# Patient Record
Sex: FEMALE | Race: WHITE | NOT HISPANIC OR LATINO | URBAN - METROPOLITAN AREA
[De-identification: names, ages, dates, MRNs, and addresses within clinical notes are randomized per-mention and may not be internally consistent; named-entity substitution may affect disease eponyms.]

---

## 2017-08-22 ENCOUNTER — EMERGENCY (EMERGENCY)
Facility: HOSPITAL | Age: 52
LOS: 0 days | Discharge: HOME | End: 2017-08-22
Attending: EMERGENCY MEDICINE | Admitting: EMERGENCY MEDICINE
Payer: MEDICAID

## 2017-08-22 VITALS
OXYGEN SATURATION: 99 % | RESPIRATION RATE: 17 BRPM | SYSTOLIC BLOOD PRESSURE: 108 MMHG | HEART RATE: 88 BPM | DIASTOLIC BLOOD PRESSURE: 58 MMHG

## 2017-08-22 VITALS
OXYGEN SATURATION: 96 % | HEART RATE: 108 BPM | TEMPERATURE: 99 F | DIASTOLIC BLOOD PRESSURE: 85 MMHG | HEIGHT: 64 IN | RESPIRATION RATE: 18 BRPM | WEIGHT: 154.98 LBS | SYSTOLIC BLOOD PRESSURE: 124 MMHG

## 2017-08-22 DIAGNOSIS — S82.62XA DISPLACED FRACTURE OF LATERAL MALLEOLUS OF LEFT FIBULA, INITIAL ENCOUNTER FOR CLOSED FRACTURE: ICD-10-CM

## 2017-08-22 DIAGNOSIS — S89.82XA OTHER SPECIFIED INJURIES OF LEFT LOWER LEG, INITIAL ENCOUNTER: ICD-10-CM
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
## 2017-08-22 DIAGNOSIS — F17.210 NICOTINE DEPENDENCE, CIGARETTES, UNCOMPLICATED: ICD-10-CM
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
## 2017-08-22 PROCEDURE — 73630 X-RAY EXAM OF FOOT: CPT | Mod: 26,LT

## 2017-08-22 PROCEDURE — 99285 EMERGENCY DEPT VISIT HI MDM: CPT

## 2017-08-22 PROCEDURE — 73600 X-RAY EXAM OF ANKLE: CPT | Mod: 26

## 2017-08-22 PROCEDURE — 73610 X-RAY EXAM OF ANKLE: CPT | Mod: 26,RT

## 2017-08-22 RX ORDER — OXYCODONE AND ACETAMINOPHEN 5; 325 MG/1; MG/1
1 TABLET ORAL ONCE
Qty: 0 | Refills: 0 | Status: DISCONTINUED | OUTPATIENT
Start: 2017-08-22 | End: 2017-08-22

## 2017-08-22 RX ORDER — ACETAMINOPHEN 500 MG
650 TABLET ORAL ONCE
Qty: 0 | Refills: 0 | Status: COMPLETED | OUTPATIENT
Start: 2017-08-22 | End: 2017-08-22

## 2017-08-22 RX ADMIN — Medication 650 MILLIGRAM(S): at 12:43

## 2017-08-22 RX ADMIN — OXYCODONE AND ACETAMINOPHEN 1 TABLET(S): 5; 325 TABLET ORAL at 14:58

## 2017-08-22 NOTE — ED PROVIDER NOTE - OBJECTIVE STATEMENT
52 year old female with no significant past medical history, in with fall today, states was stumbling down about 12 stairs and twisted her ankle and felt a pop. Now with left ankle pain.  Denies hitting her head, denies LOC.  Nothing else hurting.  No prior injury to that ankle.  Tried to stand, but was unable to bear weight.  Does endorse drinking 3 "tall boys" today.

## 2017-08-22 NOTE — ED PROVIDER NOTE - MEDICAL DECISION MAKING DETAILS
52F in with ankle pain s/p stumbling down stairs,  ankle xray, pain meds, likely splint with crutches.

## 2017-08-22 NOTE — CONSULT NOTE ADULT - SUBJECTIVE AND OBJECTIVE BOX
Orthopedics Clinic Note:    This is a 53y/o female who presents to the ED c/o left ankle pain, deformity and inability to bear weight s/p fall down several stairs with twisting ankle injury and audible 'pop.' Pt admits to drinking prior to fall. Pt denies any pain or injury anywhere else. Pt denies numbness, tingling and paresthesias.     PMHx:  Current everyday smoker    PSHx:  Denies    Allergies:  Doxycycline    Medications:  Denies    X-rays show a lateral mal ankle fracture with widened medial clear-space/mortise and possible small posterior mal fx; dionne/trimal? equivalent.    PE left ankle:  +deformity, +mild-moderate swelling, skin intact, +ecchymosis, no erythema, normothermic. +TTP. LROM 2' pain. Moving all toes, sensation intact. DP and PT pulses 2+.    Procedure:  Closed reduction of left ankle fracture and application of trilam splint performed at bedside in ED after hematoma block of mL 1% lidocaine administered by Resident. Pt tolerated procedure well with improved pain, sensation intact, moving all toes, cap refill <2secs post-procedure.    Assessment:   53y/o female with left ankle dionne/?trimal exquivalent fracture.    Plan:  NWB LLE in Trilam splint with crutches.  Pain control.  Ice application to left ankle.  LLE elevation.    Orthopedic Attending aware of consult; Awaiting definitive plan. Orthopedics Clinic Note:    This is a 51y/o female who presents to the ED c/o left ankle pain, deformity and inability to bear weight s/p fall down several stairs with twisting ankle injury and audible 'pop.' Pt admits to drinking prior to fall. Pt denies any pain or injury anywhere else. Pt denies numbness, tingling and paresthesias.     PMHx:  Current everyday smoker    PSHx:  Denies    Allergies:  Doxycycline    Medications:  Denies    X-rays show a lateral mal ankle fracture with widened medial clear-space/mortise and possible small posterior mal fx; dionne/trimal? equivalent.    PE left ankle:  +deformity, +mild-moderate swelling, skin intact, +ecchymosis, no erythema, normothermic. +TTP. LROM 2' pain. Moving all toes, sensation intact. DP and PT pulses 2+.    Procedure:  Closed reduction of left ankle fracture and application of trilam splint performed at bedside in ED after hematoma block of mL 1% lidocaine administered by Resident. Pt tolerated procedure well with improved pain, sensation intact, moving all toes, cap refill <2secs post-procedure.    Assessment:   51y/o female with left ankle dionne/?trimal exquivalent fracture.    Plan:  NWB LLE in Trilam splint with crutches.  Pain control.  Ice application to left ankle.  LLE elevation.    Orthopedic Attending aware of consult; Awaiting definitive plan.    Addendum 8/22/17 8820:    Case discussed with Dr. Lazo.  Plan as above.  Pt is orthopedically stable for discharge home.  Pt advised that she will need surgical fixation of ankle fracture when swelling subsides; to be scheduled as OP.   Recommend f/u with [facility].  Pt can f/u with Dr. Lazo as OP prn any issues. Orthopedics Clinic Note:    This is a 51y/o female who presents to the ED c/o left ankle pain, deformity and inability to bear weight s/p fall down several stairs with twisting ankle injury and audible 'pop.' Pt admits to drinking prior to fall. Pt denies any pain or injury anywhere else. Pt denies numbness, tingling and paresthesias.     PMHx:  Current everyday smoker    PSHx:  Denies    Allergies:  Doxycycline    Medications:  Denies    X-rays show a lateral mal ankle fracture with widened medial clear-space/mortise and possible small posterior mal fx; dionne/trimal? equivalent.    PE left ankle:  +deformity, +mild-moderate swelling, skin intact, +ecchymosis, no erythema, normothermic. +TTP. LROM 2' pain. Moving all toes, sensation intact. DP and PT pulses 2+.    Procedure:  Closed reduction of left ankle fracture and application of trilam splint performed at bedside in ED after hematoma block of mL 1% lidocaine administered by Resident. Pt tolerated procedure well with improved pain, sensation intact, moving all toes, cap refill <2secs post-procedure.    Assessment:   51y/o female with left ankle dionne/?trimal exquivalent fracture.    Plan:  NWB LLE in Trilam splint with crutches.  Pain control.  Ice application to left ankle.  LLE elevation.    Orthopedic Attending aware of consult; Awaiting definitive plan.    Addendum 8/22/17 6610:    Post-reduction X-rays of the left ankle shows improved fracture alignment.    Case discussed with Dr. Lazo.  Plan as above.  Pt is orthopedically stable for discharge home.  Pt advised that she will need surgical fixation of ankle fracture when swelling subsides; to be scheduled as OP.   Recommend f/u with [facility].  Pt can f/u with Dr. Lazo as OP prn any issues. Orthopedics Clinic Note:    This is a 51y/o female who presents to the ED c/o left ankle pain, deformity and inability to bear weight s/p fall down several stairs with twisting ankle injury and audible 'pop.' Pt admits to drinking prior to fall. Pt denies any pain or injury anywhere else. Pt denies numbness, tingling and paresthesias.     PMHx:  Current everyday smoker    PSHx:  Denies    Allergies:  Doxycycline    Medications:  Denies    X-rays show a lateral mal ankle fracture with widened medial clear-space/mortise and possible small posterior mal fx; dionne/trimal? equivalent.    PE left ankle:  +deformity, +mild-moderate swelling, skin intact, +ecchymosis, no erythema, normothermic. +TTP. LROM 2' pain. Moving all toes, sensation intact. DP and PT pulses 2+.    Procedure:  Closed reduction of left ankle fracture and application of trilam splint performed at bedside in ED after hematoma block of 8mL 1% lidocaine administered by Resident. Pt tolerated procedure well with improved pain, sensation intact, moving all toes, cap refill <2secs post-procedure.    Assessment:   51y/o female with left ankle dionne/?trimal exquivalent fracture.    Plan:  NWB LLE in Trilam splint with crutches.  Pain control.  Ice application to left ankle.  LLE elevation.    Orthopedic Attending aware of consult; Awaiting definitive plan.    Addendum 8/22/17 1900:    Post-reduction X-rays of the left ankle shows improved fracture alignment.    Case discussed with Dr. Lazo.  Plan as above.  Pt is orthopedically stable for discharge home.  Pt advised that she will need surgical fixation of ankle fracture when swelling subsides; to be scheduled as OP.   Recommend f/u with [facility].  Pt can f/u with Dr. Lazo as OP prn any issues.

## 2017-08-22 NOTE — ED PROVIDER NOTE - PHYSICAL EXAMINATION
cap refill < 2 seconds, pulses intact, swelling and pain to palpation over medial malleolus. ROM toes normal, sensation intact.  Limited ROM of ankle 2/2 pain.

## 2017-08-22 NOTE — ED PROVIDER NOTE - ATTENDING CONTRIBUTION TO CARE
AJM: Pt is a 52 year old female with history of active etoh abuse presents with left ankle injury. She notes she tripped on the stairs and injured her ankle. No hrad trauma/loc. No back, chest, abd pain. No numbness in left foot. On exam pt has swelling, ttp, and bruising ot medial mal in left ankle. likely fx. NV intact. xrays, pain meds

## 2017-08-22 NOTE — ED ADULT NURSE NOTE - OBJECTIVE STATEMENT
PT is AOB, belligerent and tearful. Pt states she was walking down stairs ( approx 12) " kicking boxes down the steps in anger and heard her ankle snap" pt denies LOC, states " I was drinking." pt consumed approx 3 " tallboys". Left ankle medial is bruised and swollen painful to palpation

## 2017-09-06 ENCOUNTER — APPOINTMENT (OUTPATIENT)
Dept: ORTHOPEDIC SURGERY | Facility: CLINIC | Age: 52
End: 2017-09-06
Payer: COMMERCIAL

## 2017-09-06 VITALS
BODY MASS INDEX: 26.58 KG/M2 | DIASTOLIC BLOOD PRESSURE: 81 MMHG | SYSTOLIC BLOOD PRESSURE: 118 MMHG | HEIGHT: 63 IN | TEMPERATURE: 98.2 F | WEIGHT: 150 LBS | HEART RATE: 80 BPM

## 2017-09-06 DIAGNOSIS — S82.852A DISPLACED TRIMALLEOLAR FRACTURE OF LEFT LOWER LEG, INITIAL ENCOUNTER FOR CLOSED FRACTURE: ICD-10-CM

## 2017-09-06 DIAGNOSIS — F17.200 NICOTINE DEPENDENCE, UNSPECIFIED, UNCOMPLICATED: ICD-10-CM

## 2017-09-06 DIAGNOSIS — Z87.09 PERSONAL HISTORY OF OTHER DISEASES OF THE RESPIRATORY SYSTEM: ICD-10-CM

## 2017-09-06 PROBLEM — Z00.00 ENCOUNTER FOR PREVENTIVE HEALTH EXAMINATION: Status: ACTIVE | Noted: 2017-09-06

## 2017-09-06 PROCEDURE — 99214 OFFICE O/P EST MOD 30 MIN: CPT

## 2017-09-07 ENCOUNTER — OUTPATIENT (OUTPATIENT)
Dept: OUTPATIENT SERVICES | Facility: HOSPITAL | Age: 52
LOS: 1 days | Discharge: HOME | End: 2017-09-07
Payer: MEDICAID

## 2017-09-07 DIAGNOSIS — Z98.890 OTHER SPECIFIED POSTPROCEDURAL STATES: Chronic | ICD-10-CM

## 2017-09-07 DIAGNOSIS — S82.852A DISPLACED TRIMALLEOLAR FRACTURE OF LEFT LOWER LEG, INITIAL ENCOUNTER FOR CLOSED FRACTURE: ICD-10-CM

## 2017-09-07 DIAGNOSIS — Z96.7 PRESENCE OF OTHER BONE AND TENDON IMPLANTS: Chronic | ICD-10-CM

## 2017-09-07 LAB
ANION GAP SERPL CALC-SCNC: 7 MMOL/L — SIGNIFICANT CHANGE UP (ref 5–17)
APPEARANCE UR: CLEAR — SIGNIFICANT CHANGE UP
APTT BLD: 34.7 SEC — SIGNIFICANT CHANGE UP (ref 27.5–37.4)
BASOPHILS # BLD AUTO: 0.1 K/UL — SIGNIFICANT CHANGE UP (ref 0–0.2)
BASOPHILS NFR BLD AUTO: 1.5 % — SIGNIFICANT CHANGE UP (ref 0–2)
BILIRUB UR-MCNC: NEGATIVE — SIGNIFICANT CHANGE UP
BUN SERPL-MCNC: 6 MG/DL — LOW (ref 7–23)
CALCIUM SERPL-MCNC: 9.7 MG/DL — SIGNIFICANT CHANGE UP (ref 8.5–10.1)
CHLORIDE SERPL-SCNC: 108 MMOL/L — SIGNIFICANT CHANGE UP (ref 96–108)
CO2 SERPL-SCNC: 28 MMOL/L — SIGNIFICANT CHANGE UP (ref 22–31)
COLOR SPEC: YELLOW — SIGNIFICANT CHANGE UP
CREAT SERPL-MCNC: 0.74 MG/DL — SIGNIFICANT CHANGE UP (ref 0.5–1.3)
DIFF PNL FLD: NEGATIVE — SIGNIFICANT CHANGE UP
EOSINOPHIL # BLD AUTO: 0.3 K/UL — SIGNIFICANT CHANGE UP (ref 0–0.5)
EOSINOPHIL NFR BLD AUTO: 3.9 % — SIGNIFICANT CHANGE UP (ref 0–6)
GLUCOSE SERPL-MCNC: 95 MG/DL — SIGNIFICANT CHANGE UP (ref 70–99)
GLUCOSE UR QL: NEGATIVE MG/DL — SIGNIFICANT CHANGE UP
HCT VFR BLD CALC: 40.3 % — SIGNIFICANT CHANGE UP (ref 34.5–45)
HGB BLD-MCNC: 13.8 G/DL — SIGNIFICANT CHANGE UP (ref 11.5–15.5)
INR BLD: 1.12 RATIO — SIGNIFICANT CHANGE UP (ref 0.88–1.16)
KETONES UR-MCNC: NEGATIVE — SIGNIFICANT CHANGE UP
LEUKOCYTE ESTERASE UR-ACNC: NEGATIVE — SIGNIFICANT CHANGE UP
LYMPHOCYTES # BLD AUTO: 2.3 K/UL — SIGNIFICANT CHANGE UP (ref 1–3.3)
LYMPHOCYTES # BLD AUTO: 26.3 % — SIGNIFICANT CHANGE UP (ref 13–44)
MCHC RBC-ENTMCNC: 30.7 PG — SIGNIFICANT CHANGE UP (ref 27–34)
MCHC RBC-ENTMCNC: 34.2 GM/DL — SIGNIFICANT CHANGE UP (ref 32–36)
MCV RBC AUTO: 89.6 FL — SIGNIFICANT CHANGE UP (ref 80–100)
MONOCYTES # BLD AUTO: 0.5 K/UL — SIGNIFICANT CHANGE UP (ref 0–0.9)
MONOCYTES NFR BLD AUTO: 5.4 % — SIGNIFICANT CHANGE UP (ref 2–14)
NEUTROPHILS # BLD AUTO: 5.5 K/UL — SIGNIFICANT CHANGE UP (ref 1.8–7.4)
NEUTROPHILS NFR BLD AUTO: 63 % — SIGNIFICANT CHANGE UP (ref 43–77)
NITRITE UR-MCNC: NEGATIVE — SIGNIFICANT CHANGE UP
PH UR: 7 — SIGNIFICANT CHANGE UP (ref 5–8)
PLATELET # BLD AUTO: 320 K/UL — SIGNIFICANT CHANGE UP (ref 150–400)
POTASSIUM SERPL-MCNC: 3.9 MMOL/L — SIGNIFICANT CHANGE UP (ref 3.5–5.3)
POTASSIUM SERPL-SCNC: 3.9 MMOL/L — SIGNIFICANT CHANGE UP (ref 3.5–5.3)
PROT UR-MCNC: NEGATIVE MG/DL — SIGNIFICANT CHANGE UP
PROTHROM AB SERPL-ACNC: 12.1 SEC — SIGNIFICANT CHANGE UP (ref 9.8–12.7)
RBC # BLD: 4.5 M/UL — SIGNIFICANT CHANGE UP (ref 3.8–5.2)
RBC # FLD: 11.9 % — SIGNIFICANT CHANGE UP (ref 10.3–14.5)
SODIUM SERPL-SCNC: 143 MMOL/L — SIGNIFICANT CHANGE UP (ref 135–145)
SP GR SPEC: 1 — LOW (ref 1.01–1.02)
TYPE + AB SCN PNL BLD: SIGNIFICANT CHANGE UP
UROBILINOGEN FLD QL: NEGATIVE MG/DL — SIGNIFICANT CHANGE UP
WBC # BLD: 8.8 K/UL — SIGNIFICANT CHANGE UP (ref 3.8–10.5)
WBC # FLD AUTO: 8.8 K/UL — SIGNIFICANT CHANGE UP (ref 3.8–10.5)

## 2017-09-07 PROCEDURE — 93010 ELECTROCARDIOGRAM REPORT: CPT

## 2017-09-07 PROCEDURE — 71020: CPT | Mod: 26

## 2017-09-07 RX ORDER — ASCORBIC ACID 60 MG
1 TABLET,CHEWABLE ORAL
Qty: 0 | Refills: 0 | COMMUNITY

## 2017-09-07 RX ORDER — MULTIVIT-MIN/FERROUS GLUCONATE 9 MG/15 ML
1 LIQUID (ML) ORAL
Qty: 0 | Refills: 0 | COMMUNITY

## 2017-09-07 NOTE — CHART NOTE - NSCHARTNOTEFT_GEN_A_CORE
ht 63 inches  wt 143 lbs/ 65 kg    BP left arm sitting 150/95  recheck 15 minutes later right arm sitting 149/92  Renea 97.9 F  RR 16/min   O2 sat 100% on RA  HR 74 bpm

## 2017-09-07 NOTE — ASU PATIENT PROFILE, ADULT - PMH
Ankle fracture  left  Cigarette smoker    Cough    Seasonal allergies Ankle fracture  left  Cigarette smoker    Cough    Elevated blood pressure reading    Seasonal allergies

## 2017-09-07 NOTE — ASU PATIENT PROFILE, ADULT - PSH
Delivered by  section    H/O neck surgery  disc replacement   S/P D&C (status post dilation and curettage)    S/P ORIF (open reduction internal fixation) fracture  jaw wiring for fracture s/p "fall down the stairs"

## 2017-09-08 PROBLEM — Z78.9 NEVER EXERCISES: Status: ACTIVE | Noted: 2017-09-06

## 2017-09-08 PROBLEM — S82.852A TRIMALLEOLAR FRACTURE OF LEFT ANKLE: Status: ACTIVE | Noted: 2017-09-06

## 2017-09-08 PROBLEM — Z78.9 DOES NOT USE ILLICIT DRUGS: Status: ACTIVE | Noted: 2017-09-06

## 2017-09-09 ENCOUNTER — APPOINTMENT (OUTPATIENT)
Dept: ORTHOPEDIC SURGERY | Facility: HOSPITAL | Age: 52
End: 2017-09-09
Payer: COMMERCIAL

## 2017-09-09 ENCOUNTER — OUTPATIENT (OUTPATIENT)
Dept: OUTPATIENT SERVICES | Facility: HOSPITAL | Age: 52
LOS: 1 days | Discharge: HOME | End: 2017-09-09

## 2017-09-09 VITALS
TEMPERATURE: 98 F | DIASTOLIC BLOOD PRESSURE: 86 MMHG | SYSTOLIC BLOOD PRESSURE: 114 MMHG | RESPIRATION RATE: 17 BRPM | OXYGEN SATURATION: 100 % | HEART RATE: 73 BPM

## 2017-09-09 VITALS
HEART RATE: 82 BPM | RESPIRATION RATE: 16 BRPM | DIASTOLIC BLOOD PRESSURE: 86 MMHG | SYSTOLIC BLOOD PRESSURE: 114 MMHG | TEMPERATURE: 99 F | OXYGEN SATURATION: 98 %

## 2017-09-09 DIAGNOSIS — Z78.9 OTHER SPECIFIED HEALTH STATUS: ICD-10-CM

## 2017-09-09 DIAGNOSIS — Z98.890 OTHER SPECIFIED POSTPROCEDURAL STATES: Chronic | ICD-10-CM

## 2017-09-09 DIAGNOSIS — Z96.7 PRESENCE OF OTHER BONE AND TENDON IMPLANTS: Chronic | ICD-10-CM

## 2017-09-09 PROCEDURE — 76000 FLUOROSCOPY <1 HR PHYS/QHP: CPT | Mod: 26

## 2017-09-09 PROCEDURE — 27814 TREATMENT OF ANKLE FRACTURE: CPT | Mod: LT

## 2017-09-09 RX ORDER — ONDANSETRON 8 MG/1
1 TABLET, FILM COATED ORAL
Qty: 10 | Refills: 0 | OUTPATIENT
Start: 2017-09-09

## 2017-09-09 RX ORDER — CEPHALEXIN 500 MG
1 CAPSULE ORAL
Qty: 15 | Refills: 0 | OUTPATIENT
Start: 2017-09-09 | End: 2017-09-14

## 2017-09-09 RX ORDER — ASPIRIN/CALCIUM CARB/MAGNESIUM 324 MG
325 TABLET ORAL
Qty: 9750 | Refills: 0 | OUTPATIENT
Start: 2017-09-09 | End: 2017-10-09

## 2017-09-09 RX ORDER — CEPHALEXIN 500 MG
250 CAPSULE ORAL EVERY 8 HOURS
Qty: 0 | Refills: 0 | Status: DISCONTINUED | OUTPATIENT
Start: 2017-09-09 | End: 2017-09-09

## 2017-09-09 RX ORDER — DOCUSATE SODIUM 100 MG
1 CAPSULE ORAL
Qty: 21 | Refills: 0 | OUTPATIENT
Start: 2017-09-09 | End: 2017-09-16

## 2017-09-09 RX ORDER — OXYCODONE AND ACETAMINOPHEN 5; 325 MG/1; MG/1
1 TABLET ORAL EVERY 4 HOURS
Qty: 0 | Refills: 0 | Status: DISCONTINUED | OUTPATIENT
Start: 2017-09-09 | End: 2017-09-09

## 2017-09-09 RX ORDER — ONDANSETRON 8 MG/1
4 TABLET, FILM COATED ORAL EVERY 6 HOURS
Qty: 0 | Refills: 0 | Status: DISCONTINUED | OUTPATIENT
Start: 2017-09-09 | End: 2017-09-09

## 2017-09-09 RX ORDER — LORATADINE 10 MG/1
10 TABLET ORAL ONCE
Qty: 0 | Refills: 0 | Status: COMPLETED | OUTPATIENT
Start: 2017-09-09 | End: 2017-09-09

## 2017-09-09 RX ORDER — DOCUSATE SODIUM 100 MG
100 CAPSULE ORAL
Qty: 0 | Refills: 0 | Status: DISCONTINUED | OUTPATIENT
Start: 2017-09-09 | End: 2017-09-09

## 2017-09-09 RX ORDER — ASPIRIN/CALCIUM CARB/MAGNESIUM 324 MG
325 TABLET ORAL DAILY
Qty: 0 | Refills: 0 | Status: DISCONTINUED | OUTPATIENT
Start: 2017-09-09 | End: 2017-09-09

## 2017-09-09 RX ORDER — CEFAZOLIN SODIUM 1 G
2000 VIAL (EA) INJECTION ONCE
Qty: 0 | Refills: 0 | Status: DISCONTINUED | OUTPATIENT
Start: 2017-09-09 | End: 2017-09-09

## 2017-09-09 RX ADMIN — Medication 250 MILLIGRAM(S): at 14:52

## 2017-09-09 RX ADMIN — Medication 325 MILLIGRAM(S): at 14:51

## 2017-09-09 RX ADMIN — LORATADINE 10 MILLIGRAM(S): 10 TABLET ORAL at 18:30

## 2017-09-09 NOTE — DOWNTIME INTERRUPTION NOTE - WHICH MANUAL FORMS INITIATED?
[place] downtime 09/08/2017 2200 - 09/09/2017 1100 am.    Paper documentation (MAR, I&O, VS, Progress Notes, Order Sheets).

## 2017-09-09 NOTE — ASU DISCHARGE PLAN (ADULT/PEDIATRIC). - MEDICATION SUMMARY - MEDICATIONS TO TAKE
I will START or STAY ON the medications listed below when I get home from the hospital:    aspirin  -- 325 milligram(s) by mouth once a day MDD:1 tabs  -- Indication: For dvt ppx    oxyCODONE-acetaminophen 5 mg-325 mg oral tablet  -- 1 tab(s) by mouth every 4 hours, As Needed -for severe pain MDD:6  -- Caution federal law prohibits the transfer of this drug to any person other  than the person for whom it was prescribed.  May cause drowsiness.  Alcohol may intensify this effect.  Use care when operating dangerous machinery.  This prescription cannot be refilled.  This product contains acetaminophen.  Do not use  with any other product containing acetaminophen to prevent possible liver damage.  Using more of this medication than prescribed may cause serious breathing problems.    -- Indication: For prn pain    Zofran 4 mg oral tablet  -- 1 tab(s) by mouth every 6 hours, As Needed for nausea.  -- Indication: For prn nausea/vommitting    Colace 100 mg oral capsule  -- 1 cap(s) by mouth 3 times a day, As Needed -for constipation MDD:3  -- Medication should be taken with plenty of water.    -- Indication: For while taking pain meds for constipation    Daily Multi  -- 1 dose(s) by mouth once a day  -- Indication: For Home med    Calcium 500+D oral tablet, chewable  -- 1 tab(s) by mouth once a day  -- Indication: For home med    Vitamin C 500 mg oral tablet, chewable  -- 1 tab(s) by mouth once a day  -- Indication: For home med
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
## 2017-09-15 DIAGNOSIS — F17.210 NICOTINE DEPENDENCE, CIGARETTES, UNCOMPLICATED: ICD-10-CM

## 2017-09-15 DIAGNOSIS — J45.909 UNSPECIFIED ASTHMA, UNCOMPLICATED: ICD-10-CM
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
## 2017-09-15 DIAGNOSIS — S82.852A DISPLACED TRIMALLEOLAR FRACTURE OF LEFT LOWER LEG, INITIAL ENCOUNTER FOR CLOSED FRACTURE: ICD-10-CM

## 2017-09-18 ENCOUNTER — APPOINTMENT (OUTPATIENT)
Dept: ORTHOPEDIC SURGERY | Facility: CLINIC | Age: 52
End: 2017-09-18
Payer: COMMERCIAL

## 2017-09-18 PROCEDURE — 29405 APPL SHORT LEG CAST: CPT | Mod: 58,LT

## 2017-09-18 PROCEDURE — 73610 X-RAY EXAM OF ANKLE: CPT | Mod: LT

## 2017-09-18 PROCEDURE — 99024 POSTOP FOLLOW-UP VISIT: CPT

## 2017-10-02 ENCOUNTER — APPOINTMENT (OUTPATIENT)
Dept: ORTHOPEDIC SURGERY | Facility: CLINIC | Age: 52
End: 2017-10-02
Payer: COMMERCIAL

## 2017-10-02 VITALS
WEIGHT: 150 LBS | TEMPERATURE: 97.9 F | HEART RATE: 76 BPM | HEIGHT: 63 IN | SYSTOLIC BLOOD PRESSURE: 120 MMHG | BODY MASS INDEX: 26.58 KG/M2 | DIASTOLIC BLOOD PRESSURE: 80 MMHG

## 2017-10-02 PROCEDURE — 73610 X-RAY EXAM OF ANKLE: CPT | Mod: LT

## 2017-10-02 PROCEDURE — 99024 POSTOP FOLLOW-UP VISIT: CPT

## 2017-10-02 PROCEDURE — 29405 APPL SHORT LEG CAST: CPT | Mod: 58,LT

## 2017-10-02 RX ORDER — NICOTINE POLACRILEX 2 MG/1
2 LOZENGE ORAL
Qty: 81 | Refills: 0 | Status: ACTIVE | COMMUNITY
Start: 2017-09-14

## 2017-10-02 RX ORDER — NICOTINE 21 MG/24HR
21 PATCH, TRANSDERMAL 24 HOURS TRANSDERMAL
Qty: 28 | Refills: 0 | Status: ACTIVE | COMMUNITY
Start: 2017-08-25

## 2017-10-02 RX ORDER — CEPHALEXIN 500 MG/1
500 CAPSULE ORAL 3 TIMES DAILY
Qty: 21 | Refills: 0 | Status: ACTIVE | COMMUNITY
Start: 2017-10-02 | End: 1900-01-01

## 2017-10-02 RX ORDER — CEPHALEXIN 500 MG/1
500 CAPSULE ORAL
Qty: 15 | Refills: 0 | Status: ACTIVE | COMMUNITY
Start: 2017-09-09

## 2017-10-02 RX ORDER — OXYCODONE AND ACETAMINOPHEN 5; 325 MG/1; MG/1
5-325 TABLET ORAL
Qty: 42 | Refills: 0 | Status: ACTIVE | COMMUNITY
Start: 2017-09-09

## 2017-10-02 RX ORDER — DOCUSATE SODIUM 100 MG/1
100 CAPSULE ORAL
Qty: 21 | Refills: 0 | Status: ACTIVE | COMMUNITY
Start: 2017-09-09

## 2017-10-02 RX ORDER — ONDANSETRON 4 MG/1
4 TABLET ORAL
Qty: 9 | Refills: 0 | Status: ACTIVE | COMMUNITY
Start: 2017-09-09

## 2017-10-09 ENCOUNTER — APPOINTMENT (OUTPATIENT)
Dept: ORTHOPEDIC SURGERY | Facility: CLINIC | Age: 52
End: 2017-10-09
Payer: COMMERCIAL

## 2017-10-09 VITALS
HEIGHT: 63 IN | DIASTOLIC BLOOD PRESSURE: 80 MMHG | WEIGHT: 150 LBS | HEART RATE: 75 BPM | SYSTOLIC BLOOD PRESSURE: 120 MMHG | TEMPERATURE: 97.7 F | BODY MASS INDEX: 26.58 KG/M2

## 2017-10-09 PROCEDURE — 99024 POSTOP FOLLOW-UP VISIT: CPT

## 2017-10-09 PROCEDURE — 97760 ORTHOTIC MGMT&TRAING 1ST ENC: CPT

## 2017-10-23 ENCOUNTER — APPOINTMENT (OUTPATIENT)
Dept: ORTHOPEDIC SURGERY | Facility: CLINIC | Age: 52
End: 2017-10-23
Payer: COMMERCIAL

## 2017-10-23 PROCEDURE — 99024 POSTOP FOLLOW-UP VISIT: CPT

## 2017-10-23 PROCEDURE — 73610 X-RAY EXAM OF ANKLE: CPT | Mod: LT

## 2017-11-13 ENCOUNTER — APPOINTMENT (OUTPATIENT)
Dept: ORTHOPEDIC SURGERY | Facility: CLINIC | Age: 52
End: 2017-11-13
Payer: COMMERCIAL

## 2017-11-13 PROCEDURE — 99024 POSTOP FOLLOW-UP VISIT: CPT

## 2017-11-13 PROCEDURE — 73610 X-RAY EXAM OF ANKLE: CPT | Mod: LT

## 2017-12-11 ENCOUNTER — APPOINTMENT (OUTPATIENT)
Dept: ORTHOPEDIC SURGERY | Facility: CLINIC | Age: 52
End: 2017-12-11
Payer: COMMERCIAL

## 2017-12-11 PROCEDURE — 99214 OFFICE O/P EST MOD 30 MIN: CPT

## 2017-12-11 PROCEDURE — 73610 X-RAY EXAM OF ANKLE: CPT | Mod: LT

## 2018-02-12 ENCOUNTER — APPOINTMENT (OUTPATIENT)
Dept: ORTHOPEDIC SURGERY | Facility: CLINIC | Age: 53
End: 2018-02-12
Payer: COMMERCIAL

## 2018-02-12 PROCEDURE — 99214 OFFICE O/P EST MOD 30 MIN: CPT

## 2018-02-12 PROCEDURE — 73610 X-RAY EXAM OF ANKLE: CPT | Mod: LT

## 2018-05-21 ENCOUNTER — APPOINTMENT (OUTPATIENT)
Dept: ORTHOPEDIC SURGERY | Facility: CLINIC | Age: 53
End: 2018-05-21

## 2018-06-06 ENCOUNTER — APPOINTMENT (OUTPATIENT)
Dept: ORTHOPEDIC SURGERY | Facility: CLINIC | Age: 53
End: 2018-06-06

## 2018-12-06 ENCOUNTER — TRANSCRIPTION ENCOUNTER (OUTPATIENT)
Age: 53
End: 2018-12-06

## 2019-11-27 PROBLEM — R05 COUGH: Chronic | Status: ACTIVE | Noted: 2017-09-07

## 2019-11-27 PROBLEM — R03.0 ELEVATED BLOOD-PRESSURE READING, WITHOUT DIAGNOSIS OF HYPERTENSION: Chronic | Status: ACTIVE | Noted: 2017-09-07

## 2019-11-27 PROBLEM — S82.899A OTHER FRACTURE OF UNSPECIFIED LOWER LEG, INITIAL ENCOUNTER FOR CLOSED FRACTURE: Chronic | Status: ACTIVE | Noted: 2017-09-07

## 2019-11-27 PROBLEM — F17.210 NICOTINE DEPENDENCE, CIGARETTES, UNCOMPLICATED: Chronic | Status: ACTIVE | Noted: 2017-09-07

## 2019-11-27 PROBLEM — J30.2 OTHER SEASONAL ALLERGIC RHINITIS: Chronic | Status: ACTIVE | Noted: 2017-09-07

## 2019-12-09 ENCOUNTER — APPOINTMENT (OUTPATIENT)
Dept: ORTHOPEDIC SURGERY | Facility: CLINIC | Age: 54
End: 2019-12-09

## 2020-01-09 ENCOUNTER — OTHER (OUTPATIENT)
Age: 55
End: 2020-01-09

## 2020-01-15 ENCOUNTER — TRANSCRIPTION ENCOUNTER (OUTPATIENT)
Age: 55
End: 2020-01-15

## 2020-08-26 NOTE — ED PROVIDER NOTE - RESPIRATORY, MLM
Recommend emergency room if she worsens.  We will see her in the morning. Breath sounds clear and equal bilaterally.

## 2020-10-26 ENCOUNTER — APPOINTMENT (OUTPATIENT)
Dept: ORTHOPEDIC SURGERY | Facility: CLINIC | Age: 55
End: 2020-10-26

## 2021-12-01 ENCOUNTER — APPOINTMENT (OUTPATIENT)
Dept: ORTHOPEDIC SURGERY | Facility: CLINIC | Age: 56
End: 2021-12-01

## 2022-01-16 NOTE — ASU PATIENT PROFILE, ADULT - IS PATIENT PREGNANT?
Hospitalist Progress Note   Admit Date:  2022 10:04 PM   Name:  Sonya Aguirre   Age:  56 y.o. Sex:  female  :  1961   MRN:  [id number]   Room:  [facility]/    Presenting Complaint: Fall    Reason(s) for Admission: Pneumonia [J18.9]     Hospital Course & Interval History:     Ms. Raphael Azar is a 55 yo female with PMH of asthma, DM2, CVA, obesity, neuropathy, JESSICA on CPAP admitted s/p fall with recent left humeral fracture that occurred [place] on [employer]. Not seen by ortho to date. Records were Evaluated per ortho this admit and recommends CT left shoulder and outpatient followup with Dr. Zoe Camejo. Found hypoxic and meets sepsis criteria due to fever / leukocytosis. Has CXR showing LLL pneumonia and UTI. COVID negative. She is on course of antibiotics. Patient transferred to ICU on  for acidosis, found to be in DKA. Started on glucose stabilizer. Patient initially required BiPAP and then transition to nasal cannula. CT head was done for concern for confusion and showed no strokes but there is a concern for a fungal right maxillary sinus infection. She has been started on fluconazole. Infectious disease has been consulted and recommend no invasion of bone on imaging and no finding on skin, eye or palate to suggest Mucor. She could possibly have an Aspergillus chronic sinusitis, and recommend to discontinue fluconazole and continue to treat E. coli UTI. Fungitell and Aspergillus antigen from serum ordered. Patient had an episode of coffee-ground emesis on 1/15. GI consulted and recommend continue PPI. Subjective (22): Patient seen and evaluated. Altered, but opening her eyes and following some commands. T-max 103, acidotic and tachypneic. ABGs reviewed. We will start bicarb gtt. and insulin gtt. Also patient placed back on BiPAP. Discussed case with pulmonology. I tried to call  couple of time with no answer.     Assessment & Plan:       Acute metabolic acidosis- new on 1-13-22   · Bicarb 6 on BMP  · STAT ABG and BMP, lactate ordered  · 2 amps bicarb ordered  · Repeat ABG in 2 hours   · O2 ok on 4 L NC  · STAT CXR reordered  · CTA chest when more stable  · Expand antibiotics to vancomycin and zosyn    1/16/2022   Elevated anion gap, acidotic and tachypneic. Resume bicarb gtt., insulin gtt. and BiPAP. Acute respiratory failure with hypoxia  Pneumonia  Sepsis:  · D4 antibiotics, continue vancomycin/ zosyn  · Wean O2 as tolerant, currently on high flow nasal cannula  · followup cultures -no growth to date  · CT head was done for concern for confusion and showed no strokes but there is a concern for a fungal right maxillary sinus infection. She has been started on fluconazole. Infectious disease consulted and recommend no invasion of bone on imaging and no finding on skin, eye or palate to suggest Mucor. She could possibly have an Aspergillus chronic sinusitis, and recommend to discontinue fluconazole and continue to treat E. coli UTI. Fungitell and Aspergillus antigen from serum ordered. · If mental status does not improve may need a MRI brain    1/16: In acute respiratory distress. Start patient on BiPAP. COVID PCR pending, family member positive for COVID. Pulmonology following, appreciate input    UTI .   · Continue Zosyn  · followup cultures -E. coli sensitive to Zosyn       Closed fracture of surgical neck of humerus  · Outpatient ortho  · Supportive care   · PT,OT    Prior CVA:  · Continued asa, lipitor    DM2:  · Continue SSI    Coffee-ground emesis  GI recommend continue Protonix        Dispo/Discharge Planning:    TBD    Diet: N.p.o. for now given altered mental status  DVT PPx: lovenox  Code status: Full Code    Hospital Problems as of 1/16/2022 Never Reviewed          Codes Class Noted - Resolved POA    Hematemesis with nausea ICD-10-CM: K92.0  ICD-9-CM: 578.0, 787.02  1/15/2022 - Present No        Hypernatremia ICD-10-CM: E87.0  ICD-9-CM: 276.0  1/15/2022 - Present No        Encephalopathy ICD-10-CM: G93.40  ICD-9-CM: 348.30  1/15/2022 - Present Unknown        Metabolic acidosis QTR-39-MK: E87.2  ICD-9-CM: 276.2  1/13/2022 - Present No        Pneumonia ICD-10-CM: J18.9  ICD-9-CM: 567  1/12/2022 - Present Yes        UTI (urinary tract infection) ICD-10-CM: N39.0  ICD-9-CM: 599.0  1/12/2022 - Present Yes        Hypoxia ICD-10-CM: R09.02  ICD-9-CM: 799.02  1/12/2022 - Present Yes        Obesity (Chronic) ICD-10-CM: E66.9  ICD-9-CM: 278.00  1/12/2022 - Present Yes        Closed fracture of surgical neck of humerus ICD-10-CM: S42.213A  ICD-9-CM: 812.01  1/12/2022 - Present Yes        Acute respiratory failure with hypoxia [facility]) ICD-10-CM: J96.01  ICD-9-CM: 518.81  1/12/2022 - Present Yes        * (Principal) Sepsis ([facility] [address] ICD-10-CM: A41.9  ICD-9-CM: 038.9, 995.91  1/12/2022 - Present Yes              Objective:     Patient Vitals for the past 24 hrs:   Temp Pulse Resp BP SpO2   01/16/22 1439     95 %   01/16/22 1150 (!) 103.7 °F (39.8 °C) (!) 122 (!) 45 (!) 143/101 100 %   01/16/22 1120  (!) 124 (!) 43 136/82 98 %   01/16/22 1106     100 %   01/16/22 1100 (!) 103.6 °F (39.8 °C) (!) 125 (!) 51 (!) 147/70 97 %   01/16/22 1045  (!) 125 (!) 47 (!) 162/77 99 %   01/16/22 1015  (!) 127 (!) 48 (!) 171/81 99 %   01/16/22 1000  (!) 127 (!) 45 (!) 168/74 98 %   01/16/22 0945  (!) 127 (!) 41 (!) 174/89 99 %   01/16/22 0915  (!) 128 (!) 42 (!) 162/80 98 %   01/16/22 0900  (!) 126 (!) 41 (!) 158/77 98 %   01/16/22 0845  (!) 125 (!) 38 (!) 157/82 99 %   01/16/22 0838     98 %   01/16/22 0815  (!) 124 (!) 35 (!) 160/80 98 %   01/16/22 0800  (!) 124 (!) 35 (!) 166/86 98 %   01/16/22 0745 (!) 101.5 °F (38.6 °C) (!) 124 (!) 34 (!) 166/95 98 %   01/16/22 0715  (!) 121 26 (!) 166/81 97 %   01/16/22 0700  (!) 122 30 (!) 166/84 98 %   01/16/22 0630  (!) 122 (!) 41  97 %   01/16/22 0615  (!) 117 (!) 38 (!) 161/79 98 %   01/16/22 0600  (!) 120 21 (!) 174/86 98 % 01/16/22 0545  (!) 119 26 (!) 157/95 97 %   01/16/22 0530  (!) 118 30  98 %   01/16/22 0515  (!) 119 (!) 32 (!) 158/80 98 %   01/16/22 0500  (!) 120 (!) 32  97 %   01/16/22 0445  (!) 121 (!) 36 (!) 156/81 97 %   01/16/22 0430  (!) 117 (!) 35 (!) 157/80 97 %   01/16/22 0415  (!) 116 (!) 33 (!) 167/84 98 %   01/16/22 0400  (!) 113 (!) 33 (!) 161/80 97 %   01/16/22 0345  (!) 113 (!) 33 (!) 154/81 97 %   01/16/22 0330  (!) 115 (!) 32  97 %   01/16/22 0315  (!) 110 30 (!) 150/88 97 %   01/16/22 0300 100.2 °F (37.9 °C) (!) 110 25 (!) 166/78 96 %   01/16/22 0245  (!) 118 30 (!) 152/78 99 %   01/16/22 0230  (!) 106 29  97 %   01/16/22 0215  (!) 109 (!) 31 (!) 169/79 97 %   01/16/22 0200  (!) 110 29 (!) 174/80 97 %   01/16/22 0145  (!) 105 25 (!) 148/82 96 %   01/16/22 0130  (!) 111 29  97 %   01/16/22 0115  (!) 109 28 (!) 150/78 97 %   01/16/22 0100  (!) 108 30 (!) 144/69 97 %   01/16/22 0045  (!) 107 27 (!) 159/75 97 %   01/16/22 0030  (!) 111 28  97 %   01/16/22 0015  (!) 104 24 (!) 149/74 96 %   01/16/22 0000  (!) 115 26 (!) 166/79 97 %   01/15/22 2345  (!) 108 (!) 31 (!) 147/74 96 %   01/15/22 2330  (!) 108 29 (!) 145/75 97 %   01/15/22 2315  (!) 110 30 (!) 165/83 97 %   01/15/22 2300 100.2 °F (37.9 °C) (!) 116 24 (!) 152/79 97 %   01/15/22 2245  (!) 109 (!) 31 (!) 158/80 97 %   01/15/22 2230  (!) 107 30  97 %   01/15/22 2215  (!) 109 (!) 32 (!) 162/77 97 %   01/15/22 2200  (!) 108 (!) 31 (!) 157/74 96 %   01/15/22 2145  (!) 109 (!) 31 (!) 151/74 96 %   01/15/22 2130  (!) 105 29  96 %   01/15/22 2115  (!) 109 (!) 31 (!) 153/75 97 %   01/15/22 2100  (!) 109 30 (!) 153/74 97 %   01/15/22 2045  (!) 106 (!) 31 (!) 158/73 97 %   01/15/22 2030  (!) 105 30  97 %   01/15/22 2015  (!) 111 30 (!) 147/67 97 %   01/15/22 2000  (!) 104 23 (!) 161/74 97 %   01/15/22 1945  (!) 104 28 (!) 158/72 97 %   01/15/22 1930  (!) 101 26  97 %   01/15/22 1915  (!) 102 29 (!) 157/75 97 % 01/15/22 1900 99.8 °F (37.7 °C) (!) 105 28 (!) 145/67 97 %   01/15/22 1842  (!) 104 28 (!) 154/72 97 %   01/15/22 1822  (!) 106 29 (!) 153/71 97 %   01/15/22 1802  (!) 105 27 (!) 162/71 97 %   01/15/22 1742  (!) 101 27 (!) 150/69 96 %   01/15/22 1722  (!) 103 19 (!) 164/70 96 %   01/15/22 1702  100 27 139/66 96 %   01/15/22 1642  (!) 108 27 (!) 140/68 97 %   01/15/22 1622  (!) 104 20 133/72 97 %   01/15/22 1602  (!) 106 26 (!) 144/70 97 %   01/15/22 1542 99 °F (37.2 °C) (!) 107 29 (!) 154/80 97 %   01/15/22 1522  (!) 104 25 (!) 144/66 97 %   01/15/22 1502  (!) 110 28 (!) 150/72 98 %     Oxygen Therapy  O2 Sat (%): 95 % (01/16/22 1439)  Pulse via Oximetry: 113 beats per minute (01/16/22 1439)  O2 Device: BIPAP (01/16/22 1439)  Skin Assessment: Clean, dry, & intact (01/16/22 0300)  Skin Protection for O2 Device: Yes (01/16/22 0300)  Orientation: Bilateral (01/15/22 1900)  Location: Cheek (01/15/22 1900)  Interventions: Mouth Care (01/15/22 1900)  O2 Flow Rate (L/min): 3 l/min (01/16/22 0838)  FIO2 (%): 30 % (01/16/22 1439)    Estimated body mass index is 40.24 kg/m² as calculated from the following:    Height as of this encounter: 5' 2\" (1.575 m). Weight as of this encounter: 99.8 kg (220 lb). Intake/Output Summary (Last 24 hours) at 1/16/2022 1458  Last data filed at 1/16/2022 0715  Gross per 24 hour   Intake 2353.94 ml   Output 2500 ml   Net -146.06 ml         Physical Exam:     Blood pressure (!) 143/101, pulse (!) 122, temperature (!) 103.7 °F (39.8 °C), resp. rate (!) 45, height 5' 2\" (1.575 m), weight 99.8 kg (220 lb), SpO2 95 %. General:    Well nourished. , elderly, increased respiratory rate with increased work of breathing   CV:   RRR. No m/r/g. No jugular venous distension. No edema   Lungs:     Coarse   Abdomen: Bowel sounds present. Soft, nontender, nondistended. Extremities: No cyanosis or clubbing. No edema  Skin:     No rashes and normal coloration. Warm and dry. Neuro:   grossly intact.     Psych:  anxious    I have reviewed ordered lab tests and independently visualized imaging below:    Recent Labs:  Recent Results (from the past 48 hour(s))   GLUCOSE, POC    Collection Time: 01/14/22  3:33 PM   Result Value Ref Range    Glucose (POC) 203 (H) 65 - 100 mg/dL    Performed by Eliazar Ardon    Collection Time: 01/14/22  3:34 PM   Result Value Ref Range    Glucose 203 mg/dL    Insulin order 2.9 units/hour    Insulin adminstered 2.9 units/hour    Multiplier 0.020     Low target 150 mg/dL    High target 250 mg/dL    D50 order 0.0 ml    D50 administered 0.00 ml    Minutes until next BG 60 min    Order initials MICHELE     Administered initials MICHELE     GLSCBELINDA Comments     GLUCOSE, POC    Collection Time: 01/14/22  4:37 PM   Result Value Ref Range    Glucose (POC) 219 (H) 65 - 100 mg/dL    Performed by Eliazar Ardon    Collection Time: 01/14/22  4:38 PM   Result Value Ref Range    Glucose 219 mg/dL    Insulin order 3.2 units/hour    Insulin adminstered 3.2 units/hour    Multiplier 0.020     Low target 150 mg/dL    High target 250 mg/dL    D50 order 0.0 ml    D50 administered 0.00 ml    Minutes until next BG 60 min    Order initials MICHELE     Administered initials MICHELE     GLSCOM Comments     BLOOD GAS & LYTES, ARTERIAL    Collection Time: 01/14/22  4:44 PM   Result Value Ref Range    pH (POC) 7.46 (H) 7.35 - 7.45      pCO2 (POC) 21.6 (L) 35 - 45 MMHG    pO2 (POC) 81 75 - 100 MMHG    Sodium,  (H) 136 - 145 MMOL/L    Potassium, POC 3.2 (L) 3.5 - 5.1 MMOL/L    Calcium, ionized (POC) 1.21 1.12 - 1.32 mmol/L    Glucose,  (H) 65 - 100 MG/DL    Base deficit (POC) 6.5 mmol/L    HCO3 (POC) 15.3 (L) 22 - 26 MMOL/L    CO2, POC 16 13 - 23 MMOL/L    O2 SAT 97 %    Sample source ARTERIAL      SITE RIGHT RADIAL      MISTI'S TEST Positive      Device: NASAL CANNULA      FIO2, L/min 4      Performed by Lakhani (Wallace)     GFRAA, POC Cannot no be calculated >60 ml/min/1.73m2    GFRNA, POC Cannot be calculated >60 ml/min/1.73m2   GLUCOSE, POC    Collection Time: 01/14/22  5:52 PM   Result Value Ref Range    Glucose (POC) 177 (H) 65 - 100 mg/dL    Performed by Estela Arnold    Collection Time: 01/14/22  5:53 PM   Result Value Ref Range    Glucose 177 mg/dL    Insulin order 2.3 units/hour    Insulin adminstered 2.3 units/hour    Multiplier 0.020     Low target 150 mg/dL    High target 250 mg/dL    D50 order 0.0 ml    D50 administered 0.00 ml    Minutes until next BG 60 min    Order initials TK     Administered initials TK     GLSCOM Comments     METABOLIC PANEL, BASIC    Collection Time: 01/14/22  6:18 PM   Result Value Ref Range    Sodium 151 (H) 136 - 145 mmol/L    Potassium 3.3 (L) 3.5 - 5.1 mmol/L    Chloride 119 (H) 98 - 107 mmol/L    CO2 18 (L) 21 - 32 mmol/L    Anion gap 14 7 - 16 mmol/L    Glucose 223 (H) 65 - 100 mg/dL    BUN 17 8 - 23 MG/DL    Creatinine 0.82 0.6 - 1.0 MG/DL    GFR est AA >60 >60 ml/min/1.73m2    GFR est non-AA >60 >60 ml/min/1.73m2    Calcium 8.9 8.3 - 10.4 MG/DL   MAGNESIUM    Collection Time: 01/14/22  6:18 PM   Result Value Ref Range    Magnesium 1.7 (L) 1.8 - 2.4 mg/dL   HGB & HCT    Collection Time: 01/14/22  6:18 PM   Result Value Ref Range    HGB 12.8 11.7 - 15.4 g/dL    HCT 39.2 35.8 - 46.3 %   TYPE & SCREEN    Collection Time: 01/14/22  6:33 PM   Result Value Ref Range    Crossmatch Expiration 01/17/2022,2359     ABO/Rh(D) A POSITIVE     Antibody screen NEG    GLUCOSE, POC    Collection Time: 01/14/22  6:56 PM   Result Value Ref Range    Glucose (POC) 208 (H) 65 - 100 mg/dL    Performed by Nikolay Ritter    Collection Time: 01/14/22  6:56 PM   Result Value Ref Range    Glucose 208 mg/dL    Insulin order 3.0 units/hour    Insulin adminstered 3.0 units/hour    Multiplier 0.020     Low target 150 mg/dL    High target 250 mg/dL    D50 order 0.0 ml    D50 administered 0.00 ml    Minutes until next BG 60 min    Order initials MICHELE     Administered initials Jamie Davis Comments     GLUCOSE, POC    Collection Time: 01/14/22  8:11 PM   Result Value Ref Range    Glucose (POC) 194 (H) 65 - 100 mg/dL    Performed by Latanya Madden    Collection Time: 01/14/22  8:14 PM   Result Value Ref Range    Glucose 194 mg/dL    Insulin order 2.7 units/hour    Insulin adminstered 2.7 units/hour    Multiplier 0.020     Low target 150 mg/dL    High target 250 mg/dL    D50 order 0.0 ml    D50 administered 0.00 ml    Minutes until next BG 60 min    Order initials MR     Administered initials MR     GLSCOM Comments     GLUCOSE, POC    Collection Time: 01/14/22  9:07 PM   Result Value Ref Range    Glucose (POC) 188 (H) 65 - 100 mg/dL    Performed by Gabby Lopez    Collection Time: 01/14/22  9:08 PM   Result Value Ref Range    Glucose 188 mg/dL    Insulin order 2.6 units/hour    Insulin adminstered 2.6 units/hour    Multiplier 0.020     Low target 150 mg/dL    High target 250 mg/dL    D50 order 0.0 ml    D50 administered 0.00 ml    Minutes until next BG 60 min    Order initials MR     Administered initials MR     GLSCOM Comments     GLUCOSE, POC    Collection Time: 01/14/22 10:02 PM   Result Value Ref Range    Glucose (POC) 199 (H) 65 - 100 mg/dL    Performed by Vianey Seymour    Collection Time: 01/14/22 10:04 PM   Result Value Ref Range    Glucose 199 mg/dL    Insulin order 2.8 units/hour    Insulin adminstered 2.8 units/hour    Multiplier 0.020     Low target 150 mg/dL    High target 250 mg/dL    D50 order 0.0 ml    D50 administered 0.00 ml    Minutes until next BG 60 min    Order initials MR     Administered initials MR     GLSCOM Comments     GLUCOSE, POC    Collection Time: 01/14/22 11:05 PM   Result Value Ref Range    Glucose (POC) 178 (H) 65 - 100 mg/dL    Performed by Vianey Seymour    Collection Time: 01/14/22 11:06 PM   Result Value Ref Range    Glucose 178 mg/dL    Insulin order 2.4 units/hour    Insulin adminstered 2.4 units/hour    Multiplier 0.020     Low target 150 mg/dL    High target 250 mg/dL    D50 order 0.0 ml    D50 administered 0.00 ml    Minutes until next BG 60 min    Order initials MR     Administered initials MR ROSE Comments     GLUCOSE, POC    Collection Time: 01/15/22 12:12 AM   Result Value Ref Range    Glucose (POC) 185 (H) 65 - 100 mg/dL    Performed by Marguerite Vidal    Collection Time: 01/15/22 12:13 AM   Result Value Ref Range    Glucose 185 mg/dL    Insulin order 2.5 units/hour    Insulin adminstered 2.5 units/hour    Multiplier 0.020     Low target 150 mg/dL    High target 250 mg/dL    D50 order 0.0 ml    D50 administered 0.00 ml    Minutes until next BG 60 min    Order initials MR     Administered initials MR ROSE Comments     METABOLIC PANEL, BASIC    Collection Time: 01/15/22 12:53 AM   Result Value Ref Range    Sodium 153 (H) 136 - 145 mmol/L    Potassium 2.8 (L) 3.5 - 5.1 mmol/L    Chloride 119 (H) 98 - 107 mmol/L    CO2 26 21 - 32 mmol/L    Anion gap 8 7 - 16 mmol/L    Glucose 193 (H) 65 - 100 mg/dL    BUN 14 8 - 23 MG/DL    Creatinine 0.79 0.6 - 1.0 MG/DL    GFR est AA >60 >60 ml/min/1.73m2    GFR est non-AA >60 >60 ml/min/1.73m2    Calcium 8.7 8.3 - 10.4 MG/DL   MAGNESIUM    Collection Time: 01/15/22 12:53 AM   Result Value Ref Range    Magnesium 1.7 (L) 1.8 - 2.4 mg/dL   GLUCOSE, POC    Collection Time: 01/15/22 12:58 AM   Result Value Ref Range    Glucose (POC) 182 (H) 65 - 100 mg/dL    Performed by Marguerite Vidal    Collection Time: 01/15/22 12:58 AM   Result Value Ref Range    Glucose 182 mg/dL    Insulin order 2.4 units/hour    Insulin adminstered 2.4 units/hour    Multiplier 0.020     Low target 150 mg/dL    High target 250 mg/dL    D50 order 0.0 ml    D50 administered 0.00 ml    Minutes until next BG 60 min    Order initials MR     Administered initials MR GLSCOM Comments     GLUCOSE, POC    Collection Time: 01/15/22  2:03 AM   Result Value Ref Range    Glucose (POC) 174 (H) 65 - 100 mg/dL    Performed by Enriqueta Pitts    Collection Time: 01/15/22  2:03 AM   Result Value Ref Range    Glucose 174 mg/dL    Insulin order 2.3 units/hour    Insulin adminstered 2.3 units/hour    Multiplier 0.020     Low target 150 mg/dL    High target 250 mg/dL    D50 order 0.0 ml    D50 administered 0.00 ml    Minutes until next BG 60 min    Order initials MR     Administered initials MR     GLSCBELINDA Comments     GLUCOSE, POC    Collection Time: 01/15/22  3:07 AM   Result Value Ref Range    Glucose (POC) 155 (H) 65 - 100 mg/dL    Performed by Vandana Hamilton    Collection Time: 01/15/22  3:08 AM   Result Value Ref Range    Glucose 155 mg/dL    Insulin order 1.9 units/hour    Insulin adminstered 1.9 units/hour    Multiplier 0.020     Low target 150 mg/dL    High target 250 mg/dL    D50 order 0.0 ml    D50 administered 0.00 ml    Minutes until next BG 60 min    Order initials MR     Administered initials      GLSCBELINDA Comments     CBC W/O DIFF    Collection Time: 01/15/22  3:31 AM   Result Value Ref Range    WBC 17.0 (H) 4.3 - 11.1 K/uL    RBC 4.58 4.05 - 5.2 M/uL    HGB 12.4 11.7 - 15.4 g/dL    HCT 38.4 35.8 - 46.3 %    MCV 83.8 79.6 - 97.8 FL    MCH 27.1 26.1 - 32.9 PG    MCHC 32.3 31.4 - 35.0 g/dL    RDW 15.0 (H) 11.9 - 14.6 %    PLATELET 133 (H) 176 - 450 K/uL    MPV 8.3 (L) 9.4 - 12.3 FL    ABSOLUTE NRBC 0.00 0.0 - 0.2 K/uL   METABOLIC PANEL, BASIC    Collection Time: 01/15/22  3:31 AM   Result Value Ref Range    Sodium 152 (H) 136 - 145 mmol/L    Potassium 2.7 (L) 3.5 - 5.1 mmol/L    Chloride 117 (H) 98 - 107 mmol/L    CO2 27 21 - 32 mmol/L    Anion gap 8 7 - 16 mmol/L    Glucose 164 (H) 65 - 100 mg/dL    BUN 13 8 - 23 MG/DL    Creatinine 0.70 0.6 - 1.0 MG/DL    GFR est AA >60 >60 ml/min/1.73m2    GFR est non-AA >60 >60 ml/min/1.73m2    Calcium 9.0 8.3 - 10.4 MG/DL   MAGNESIUM    Collection Time: 01/15/22  3:31 AM   Result Value Ref Range    Magnesium 2.5 (H) 1.8 - 2.4 mg/dL   BLOOD GAS, ARTERIAL POC    Collection Time: 01/15/22  3:53 AM   Result Value Ref Range    Device: NASAL CANNULA      pH (POC) 7.50 (H) 7.35 - 7.45      pCO2 (POC) 32.2 (L) 35 - 45 MMHG    pO2 (POC) 96 75 - 100 MMHG    HCO3 (POC) 25.2 22 - 26 MMOL/L    sO2 (POC) 98.1 (H) 95 - 98 %    Base excess (POC) 2.5 mmol/L    Allens test (POC) Positive      Site RIGHT BRACHIAL      Specimen type (POC) ARTERIAL      Performed by Inez     FIO2, L/min 3     GLUCOSE, POC    Collection Time: 01/15/22  4:10 AM   Result Value Ref Range    Glucose (POC) 180 (H) 65 - 100 mg/dL    Performed by Carlos Rausch    Collection Time: 01/15/22  4:10 AM   Result Value Ref Range    Glucose 180 mg/dL    Insulin order 2.4 units/hour    Insulin adminstered 2.4 units/hour    Multiplier 0.020     Low target 150 mg/dL    High target 250 mg/dL    D50 order 0.0 ml    D50 administered 0.00 ml    Minutes until next BG 60 min    Order initials MR     Administered initials      GLMAYUR Comments     GLUCOSE, POC    Collection Time: 01/15/22  5:14 AM   Result Value Ref Range    Glucose (POC) 147 (H) 65 - 100 mg/dL    Performed by Ronn Lanes    Collection Time: 01/15/22  5:15 AM   Result Value Ref Range    Glucose 147 mg/dL    Insulin order 0.9 units/hour    Insulin adminstered 0.9 units/hour    Multiplier 0.010     Low target 150 mg/dL    High target 250 mg/dL    D50 order 0.0 ml    D50 administered 0.00 ml    Minutes until next BG 60 min    Order initials MR     Administered initials      GLSCBELINDA Comments     GLUCOSE, POC    Collection Time: 01/15/22  6:11 AM   Result Value Ref Range    Glucose (POC) 157 (H) 65 - 100 mg/dL    Performed by Ronn Lanes    Collection Time: 01/15/22  6:12 AM   Result Value Ref Range    Glucose 157 mg/dL    Insulin order 1.0 units/hour    Insulin adminstered 1.0 units/hour    Multiplier 0.010     Low target 150 mg/dL    High target 250 mg/dL    D50 order 0.0 ml    D50 administered 0.00 ml    Minutes until next BG 60 min    Order initials MR     Administered initials      GLSCBELINDA Comments     GLUCOSE, POC    Collection Time: 01/15/22  7:18 AM   Result Value Ref Range    Glucose (POC) 165 (H) 65 - 100 mg/dL    Performed by BubblyBlack RockRN    GLUCOSTABILIZER    Collection Time: 01/15/22  7:20 AM   Result Value Ref Range    Glucose 165 mg/dL    Insulin order 1.1 units/hour    Insulin adminstered 1.1 units/hour    Multiplier 0.010     Low target 150 mg/dL    High target 250 mg/dL    D50 order 0.0 ml    D50 administered 0.00 ml    Minutes until next BG 60 min    Order initials MR     Administered initials      GLSCOM Comments     GLUCOSE, POC    Collection Time: 01/15/22  8:29 AM   Result Value Ref Range    Glucose (POC) 171 (H) 65 - 100 mg/dL    Performed by BubblyBlack RockRN    GLUCOSTABILIZER    Collection Time: 01/15/22  8:29 AM   Result Value Ref Range    Glucose 171 mg/dL    Insulin order 1.1 units/hour    Insulin adminstered 1.1 units/hour    Multiplier 0.010     Low target 150 mg/dL    High target 250 mg/dL    D50 order 0.0 ml    D50 administered 0.00 ml    Minutes until next BG 60 min    Order initials CM     Administered initials CM     GLSCOM Comments     SODIUM    Collection Time: 01/15/22  8:50 AM   Result Value Ref Range    Sodium 152 (H) 136 - 145 mmol/L   POTASSIUM    Collection Time: 01/15/22  8:50 AM   Result Value Ref Range    Potassium 3.2 (L) 3.5 - 5.1 mmol/L   GLUCOSE, POC    Collection Time: 01/15/22  9:31 AM   Result Value Ref Range    Glucose (POC) 185 (H) 65 - 100 mg/dL    Performed by McCGenesis MediaoughNatureWorksRN    GLUCOSTABILIZER    Collection Time: 01/15/22  9:33 AM   Result Value Ref Range    Glucose 185 mg/dL    Insulin order 1.3 units/hour    Insulin adminstered 1.3 units/hour    Multiplier 0.010 Low target 150 mg/dL    High target 250 mg/dL    D50 order 0.0 ml    D50 administered 0.00 ml    Minutes until next BG 60 min    Order initials CM     Administered initials CM     GLSCOM Comments     GLUCOSE, POC    Collection Time: 01/15/22 11:20 AM   Result Value Ref Range    Glucose (POC) 192 (H) 65 - 100 mg/dL    Performed by [address] [place] [id number], POC    Collection Time: 01/15/22 12:29 PM   Result Value Ref Range    Glucose (POC) 204 (H) 65 - 100 mg/dL    Performed by Nicho    GLUCOSE, POC    Collection Time: 01/15/22  1:38 PM   Result Value Ref Range    Glucose (POC) 216 (H) 65 - 100 mg/dL    Performed by [facility] 75. PPD TEST IN 72 HRS    Collection Time: 01/15/22  2:42 PM   Result Value Ref Range    PPD Negative Negative    mm Induration 0 0 - 5 mm   GLUCOSE, POC    Collection Time: 01/15/22  2:42 PM   Result Value Ref Range    Glucose (POC) 185 (H) 65 - 100 mg/dL    Performed by Nicho    GLUCOSE, POC    Collection Time: 01/15/22  4:14 PM   Result Value Ref Range    Glucose (POC) 176 (H) 65 - 100 mg/dL    Performed by Nicho    SODIUM    Collection Time: 01/15/22  4:16 PM   Result Value Ref Range    Sodium 148 (H) 136 - 145 mmol/L   POTASSIUM    Collection Time: 01/15/22  4:16 PM   Result Value Ref Range    Potassium 3.1 (L) 3.5 - 5.1 mmol/L   GLUCOSE, POC    Collection Time: 01/15/22  6:07 PM   Result Value Ref Range    Glucose (POC) 153 (H) 65 - 100 mg/dL    Performed by Nicho    SODIUM    Collection Time: 01/15/22 10:10 PM   Result Value Ref Range    Sodium 148 (H) 136 - 145 mmol/L   GLUCOSE, POC    Collection Time: 01/15/22 11:14 PM   Result Value Ref Range    Glucose (POC) 130 (H) 65 - 100 mg/dL    Performed by Emily    CBC W/O DIFF    Collection Time: 01/16/22  3:34 AM   Result Value Ref Range    WBC 14.4 (H) 4.3 - 11.1 K/uL    RBC 4.93 4.05 - 5.2 M/uL    HGB 13.3 11.7 - 15.4 g/dL HCT 42.2 35.8 - 46.3 %    MCV 85.6 79.6 - 97.8 FL    MCH 27.0 26.1 - 32.9 PG    MCHC 31.5 31.4 - 35.0 g/dL    RDW 15.1 (H) 11.9 - 14.6 %    PLATELET 697 823 - 342 K/uL    MPV 8.4 (L) 9.4 - 12.3 FL    ABSOLUTE NRBC 0.00 0.0 - 0.2 K/uL   SODIUM    Collection Time: 01/16/22  3:34 AM   Result Value Ref Range    Sodium 148 (H) 136 - 986 mmol/L   METABOLIC PANEL, BASIC    Collection Time: 01/16/22  3:34 AM   Result Value Ref Range    Sodium 148 (H) 136 - 145 mmol/L    Potassium 3.5 3.5 - 5.1 mmol/L    Chloride 111 (H) 98 - 107 mmol/L    CO2 19 (L) 21 - 32 mmol/L    Anion gap 18 (H) 7 - 16 mmol/L    Glucose 167 (H) 65 - 100 mg/dL    BUN 14 8 - 23 MG/DL    Creatinine 0.57 (L) 0.6 - 1.0 MG/DL    GFR est AA >60 >60 ml/min/1.73m2    GFR est non-AA >60 >60 ml/min/1.73m2    Calcium 9.2 8.3 - 10.4 MG/DL   GLUCOSE, POC    Collection Time: 01/16/22  6:28 AM   Result Value Ref Range    Glucose (POC) 164 (H) 65 - 100 mg/dL    Performed by Edgar    SARS-COV-2    Collection Time: 01/16/22  8:08 AM   Result Value Ref Range    SARS-CoV-2 Please find results under separate order     BLOOD GAS, ARTERIAL POC    Collection Time: 01/16/22  8:34 AM   Result Value Ref Range    Device: NASAL CANNULA      pH (POC) 7.45 7.35 - 7.45      pCO2 (POC) 17.4 (LL) 35 - 45 MMHG    pO2 (POC) 83 75 - 100 MMHG    HCO3 (POC) 12.2 (L) 22 - 26 MMOL/L    sO2 (POC) 97.1 95 - 98 %    Base deficit (POC) 8.8 mmol/L    Allens test (POC) Positive      Site RIGHT RADIAL      Specimen type (POC) ARTERIAL      Performed by Cliff     Critical value read back HARTZOG     FIO2, L/min 3     SODIUM    Collection Time: 01/16/22 10:34 AM   Result Value Ref Range    Sodium 149 (H) 136 - 145 mmol/L   GLUCOSE, POC    Collection Time: 01/16/22 12:50 PM   Result Value Ref Range    Glucose (POC) 215 (H) 65 - 100 mg/dL    Performed by Nicho    GLUCOSE, POC    Collection Time: 01/16/22  1:48 PM   Result Value Ref Range    Glucose (POC) 253 (H) 65 - 100 mg/dL    Performed by Kam Burciaga    Collection Time: 01/16/22  1:49 PM   Result Value Ref Range    Glucose 253 mg/dL    Insulin order 3.9 units/hour    Insulin adminstered 3.9 units/hour    Multiplier 0.020     Low target 150 mg/dL    High target 250 mg/dL    D50 order 0.0 ml    D50 administered 0.00 ml    Minutes until next BG 60 min    Order initials CM     Administered initials CM     GLSCOM Comments     METABOLIC PANEL, BASIC    Collection Time: 01/16/22  2:07 PM   Result Value Ref Range    Sodium 147 (H) 136 - 145 mmol/L    Potassium 2.9 (L) 3.5 - 5.1 mmol/L    Chloride 112 (H) 98 - 107 mmol/L    CO2 13 (L) 21 - 32 mmol/L    Anion gap 22 (H) 7 - 16 mmol/L    Glucose 280 (H) 65 - 100 mg/dL    BUN 24 (H) 8 - 23 MG/DL    Creatinine 0.68 0.6 - 1.0 MG/DL    GFR est AA >60 >60 ml/min/1.73m2    GFR est non-AA >60 >60 ml/min/1.73m2    Calcium 8.9 8.3 - 10.4 MG/DL   MAGNESIUM    Collection Time: 01/16/22  2:07 PM   Result Value Ref Range    Magnesium 2.0 1.8 - 2.4 mg/dL   PHOSPHORUS    Collection Time: 01/16/22  2:07 PM   Result Value Ref Range    Phosphorus 4.0 (H) 2.3 - 3.7 MG/DL       All Micro Results     Procedure Component Value Units Date/Time    SARS-COV-2, PCR [[id number]] Collected: 01/16/22 9862    Order Status: Completed Updated: 01/16/22 0933    CULTURE, BLOOD [[id number]] Collected: 01/12/22 1249    Order Status: Completed Specimen: Blood Updated: 01/16/22 0632     Special Requests: --        RIGHT  FOREARM       Culture result: NO GROWTH 4 DAYS       CULTURE, BLOOD [[id number]] Collected: 01/12/22 0229    Order Status: Completed Specimen: Blood Updated: 01/16/22 0632     Special Requests: --        RIGHT  Antecubital       Culture result: NO GROWTH 4 DAYS       FUNGUS CULTURE AND SMEAR [[id number]] Collected: 01/15/22 1607    Order Status: Canceled Specimen: Other     FUNGUS CULTURE AND SMEAR [[id number]] Collected: 01/15/22 1517    Order Status: Canceled Specimen: Other     CULTURE, URINE [[id number]]  (Abnormal)  (Susceptibility) Collected: 01/12/22 0227    Order Status: Completed Specimen: Urine from Clean catch Updated: 01/15/22 0733     Special Requests: NO SPECIAL REQUESTS        Culture result:       >100,000 COLONIES/mL ESCHERICHIA COLI                  50,000-100,000 COLONIES/mL NORMAL SKIN SNOW ISOLATED          COVID-19 RAPID TEST [[id number]] Collected: 01/12/22 3965    Order Status: Completed Specimen: Nasopharyngeal Updated: 01/12/22 0320     Specimen source NASAL        COVID-19 rapid test Not detected        Comment:      The specimen is NEGATIVE for SARS-CoV-2, the novel coronavirus associated with COVID-19. A negative result does not rule out COVID-19. This test has been authorized by the FDA under an Emergency Use Authorization (EUA) for use by authorized laboratories. Fact sheet for Healthcare Providers: [employer]  Fact sheet for Patients: [employer]       Methodology: Isothermal Nucleic Acid Amplification               Other Studies:  XR CHEST PORT    Result Date: 1/16/2022  Single View portable upright chest x-ray dated   January 16, 2022 Reference Exam: January 13, 2022 Indication: Respiratory failure Findings: The cardiac silhouette is normal in size and left contour. The lungs and pulmonary vascularity appear normal. Left subclavian catheter is again seen. Lungs appear clear.       Current Meds:  Current Facility-Administered Medications   Medication Dose Route Frequency    alcohol 62% (NOZIN) nasal  1 Ampule  1 Ampule Topical Q12H    dextrose 5% infusion  50 mL/hr IntraVENous CONTINUOUS    insulin regular (NOVOLIN R, HUMULIN R) 100 Units in 0.9% sodium chloride 100 mL infusion  0-50 Units/hr IntraVENous TITRATE    dextrose 40% (GLUTOSE) oral gel 1 Tube  15 g Oral PRN    glucagon (GLUCAGEN) injection 1 mg  1 mg IntraMUSCular PRN    potassium chloride 20 mEq in 100 ml IVPB  20 mEq IntraVENous ONCE    sodium bicarbonate (8.4%) 150 mEq in dextrose 5% 1,000 mL infusion   IntraVENous CONTINUOUS    sucralfate (CARAFATE) tablet 1 g  1 g Oral AC&HS    cefTRIAXone (ROCEPHIN) 1 g in 0.9% sodium chloride (MBP/ADV) 50 mL MBP  1 g IntraVENous Q24H    insulin lispro (HUMALOG) injection   SubCUTAneous Q6H    NUTRITIONAL SUPPORT ELECTROLYTE PRN ORDERS   Does Not Apply PRN    pantoprazole (PROTONIX) 40 mg in 0.9% sodium chloride 10 mL injection  40 mg IntraVENous BID    albuterol (PROVENTIL VENTOLIN) nebulizer solution 2.5 mg  2.5 mg Nebulization Q4H PRN    nystatin (MYCOSTATIN) 100,000 unit/gram powder   Topical BID    morphine injection 2 mg  2 mg IntraVENous Q4H PRN    sodium chloride (NS) flush 5-40 mL  5-40 mL IntraVENous Q8H    sodium chloride (NS) flush 5-40 mL  5-40 mL IntraVENous PRN    acetaminophen (TYLENOL) tablet 650 mg  650 mg Oral Q6H PRN    Or    acetaminophen (TYLENOL) suppository 650 mg  650 mg Rectal Q6H PRN    polyethylene glycol (MIRALAX) packet 17 g  17 g Oral DAILY PRN    ondansetron (ZOFRAN ODT) tablet 4 mg  4 mg Oral Q8H PRN    Or    ondansetron (ZOFRAN) injection 4 mg  4 mg IntraVENous Q6H PRN    enoxaparin (LOVENOX) injection 40 mg  40 mg SubCUTAneous DAILY    magnesium hydroxide (MILK OF MAGNESIA) 400 mg/5 mL oral suspension 30 mL  30 mL Oral DAILY PRN    alum-mag hydroxide-simeth (MYLANTA) oral suspension 15 mL  15 mL Oral Q6H PRN    amitriptyline (ELAVIL) tablet 25 mg  25 mg Oral QHS PRN    atorvastatin (LIPITOR) tablet 20 mg  20 mg Oral DAILY    aspirin delayed-release tablet 81 mg  81 mg Oral DAILY    budesonide-formoterol (SYMBICORT) 80-4.5 mcg inhaler  2 Puff Inhalation BID RT    sertraline (ZOLOFT) tablet 100 mg  100 mg Oral DAILY    HYDROcodone-acetaminophen (NORCO) 5-325 mg per tablet 1 Tablet  1 Tablet Oral Q8H PRN    docusate sodium (COLACE) capsule 100 mg  100 mg Oral BID       Signed:  Jorje Altamirano MD    Part of this note may have been written by using a voice dictation software. The note has been proof read but may still contain some grammatical/other typographical errors.

## 2022-06-15 ENCOUNTER — NON-APPOINTMENT (OUTPATIENT)
Age: 57
End: 2022-06-15

## 2022-06-15 ENCOUNTER — APPOINTMENT (OUTPATIENT)
Dept: ORTHOPEDIC SURGERY | Facility: CLINIC | Age: 57
End: 2022-06-15
Payer: MEDICAID

## 2022-06-15 DIAGNOSIS — M54.16 RADICULOPATHY, LUMBAR REGION: ICD-10-CM

## 2022-06-15 DIAGNOSIS — S82.852D DISPLACED TRIMALLEOLAR FRACTURE OF LEFT LOWER LEG, SUBSEQUENT ENCOUNTER FOR CLOSED FRACTURE WITH ROUTINE HEALING: ICD-10-CM

## 2022-06-15 PROCEDURE — 73610 X-RAY EXAM OF ANKLE: CPT | Mod: LT

## 2022-06-15 PROCEDURE — 99214 OFFICE O/P EST MOD 30 MIN: CPT

## 2022-06-15 NOTE — DISCUSSION/SUMMARY
[de-identified] : Today I had a lengthy discussion with the patient regarding their lumbar back pain status post ORIF of the left ankle in September 2017. I have addressed all the patient's concerns surrounding the pathology of their condition. XR imaging was completed in office today and results were reviewed with the patient. At this time, I advised against hardware removal. We did have a discussion with recommendation to follow-up with the patient's PCP in regards to anti-inflammatory utilization.The patient understood and verbally agreed to the treatment plan. All of their questions were answered and they were satisfied with the visit. The patient should call the office if they have any questions or experience worsening symptoms. I would like to see the patient back in the office in PRN to reassess their condition. 				\par

## 2022-06-15 NOTE — ADDENDUM
[FreeTextEntry1] : I, Lily Rudolph, acted solely as a scribe for Dr. Emanuel Jimenez on this date 06/15/2022.\par \par All medical record entries made by the Scribe were at my, Dr. Emanuel Jimenez, direction and personally dictated by me on 06/15/2022 . I have reviewed the chart and agree that the record accurately reflects my personal performance of the history, physical exam, assessment and plan. I have also personally directed, reviewed, and agreed with the chart.	\par

## 2022-06-15 NOTE — PHYSICAL EXAM
[de-identified] : General: Alert and oriented x3. In no acute distress. Pleasant in nature with a normal affect. No apparent respiratory distress.\par \par Left Ankle Exam\par Skin: Clean, dry, intact\par Inspection: No obvious malalignment, no swelling, no effusion; no lymphadenopathy\par Pulses: 2+ DP/PT pulses\par ROM:10 degrees of dorsiflexion, 40 degrees of plantarflexion, 10 degrees of subtalar motion\par Tenderness: No tenderness over the lateral malleolus, no CFL/ATFL/PTFL pain. No medial malleolus pain, no deltoid ligament pain. No proximal fibular pain. No heel pain.\par Stability: Negative anterior/posterior drawer.\par Strength: 5/5 TA/GS/EHL\par Neuro: In tact to light touch throughout\par Additional tests: Negative Mortons test, Negative syndesmosis squeeze test. [de-identified] : 3V of the left ankle were ordered, obtained, and reviewed by me today, 06/15/2022, revealed: Screw fracture at the head body interface. All other hardware is intact. 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 \par

## 2022-06-15 NOTE — HISTORY OF PRESENT ILLNESS
[FreeTextEntry1] : The patient is a 57 year old female presenting with her spouse for an evaluation of her left ankle. The patient is status post ORIF of the left ankle done on 9/09/2017. She is here today due to concern of pain that is stated to radiate up her left leg to her lower back. The patient cannot attribute their pain to any acute injury, fall, or trauma. She does report possible osteoarthritis to her spine. The patient presents wearing flip-flops and is walking without assistance. For pain, she does take NSAIDs with relief. No other complaints. \par

## 2022-06-15 NOTE — REASON FOR VISIT
[Initial Visit] : an initial visit for [Spouse] : spouse [FreeTextEntry2] : s/p ORIF LT ankle. DOS 9/09/2017, left ankle radiating pain

## 2023-11-30 ENCOUNTER — APPOINTMENT (OUTPATIENT)
Dept: ORTHOPEDIC SURGERY | Facility: CLINIC | Age: 58
End: 2023-11-30
Payer: MEDICAID

## 2023-11-30 VITALS
SYSTOLIC BLOOD PRESSURE: 157 MMHG | HEART RATE: 84 BPM | DIASTOLIC BLOOD PRESSURE: 98 MMHG | HEIGHT: 63 IN | WEIGHT: 150 LBS | BODY MASS INDEX: 26.58 KG/M2

## 2023-11-30 DIAGNOSIS — G56.00 CARPAL TUNNEL SYNDROME, UNSPECIFIED UPPER LIMB: ICD-10-CM

## 2023-11-30 DIAGNOSIS — M25.532 PAIN IN RIGHT WRIST: ICD-10-CM

## 2023-11-30 DIAGNOSIS — M25.531 PAIN IN RIGHT WRIST: ICD-10-CM

## 2023-11-30 DIAGNOSIS — M19.039 PRIMARY OSTEOARTHRITIS, UNSPECIFIED WRIST: ICD-10-CM

## 2023-11-30 DIAGNOSIS — S69.80XD OTHER SPECIFIED INJURIES OF UNSPECIFIED WRIST, HAND AND FINGER(S), SUBSEQUENT ENCOUNTER: ICD-10-CM

## 2023-11-30 PROCEDURE — 20605 DRAIN/INJ JOINT/BURSA W/O US: CPT | Mod: LT

## 2023-11-30 PROCEDURE — 73110 X-RAY EXAM OF WRIST: CPT | Mod: RT

## 2023-11-30 PROCEDURE — 20526 THER INJECTION CARP TUNNEL: CPT | Mod: 59,LT

## 2023-11-30 PROCEDURE — 99204 OFFICE O/P NEW MOD 45 MIN: CPT | Mod: 25

## 2024-02-22 ENCOUNTER — APPOINTMENT (OUTPATIENT)
Dept: ORTHOPEDIC SURGERY | Facility: CLINIC | Age: 59
End: 2024-02-22